# Patient Record
(demographics unavailable — no encounter records)

---

## 2024-12-02 NOTE — HISTORY OF PRESENT ILLNESS
[FreeTextEntry1] : f/u [de-identified] : 80 yo F with white coat hypertension, HLD presents for f/u. She will be having colonoscopy in Jan and requires EKG and BW.   BPs from home-- 110-140/70s-80. She does not take BP meds. Pt gets very nervous at the doctor's office. No dizziness, CP. Has been feeling well.

## 2024-12-02 NOTE — PHYSICAL EXAM
[Normal] : normal rate, regular rhythm, normal S1 and S2 and no murmur heard [de-identified] : tachycardic